# Patient Record
Sex: MALE | Race: WHITE | Employment: OTHER | URBAN - METROPOLITAN AREA
[De-identification: names, ages, dates, MRNs, and addresses within clinical notes are randomized per-mention and may not be internally consistent; named-entity substitution may affect disease eponyms.]

---

## 2022-12-17 ENCOUNTER — HOSPITAL ENCOUNTER (EMERGENCY)
Age: 77
Discharge: HOME | End: 2022-12-17
Attending: EMERGENCY MEDICINE
Payer: MEDICARE

## 2022-12-17 VITALS
DIASTOLIC BLOOD PRESSURE: 53 MMHG | RESPIRATION RATE: 16 BRPM | OXYGEN SATURATION: 98 % | TEMPERATURE: 98.1 F | HEIGHT: 72 IN | WEIGHT: 188 LBS | BODY MASS INDEX: 25.47 KG/M2 | SYSTOLIC BLOOD PRESSURE: 136 MMHG | HEART RATE: 58 BPM

## 2022-12-17 DIAGNOSIS — H61.23 BILATERAL IMPACTED CERUMEN: Primary | ICD-10-CM

## 2022-12-17 PROCEDURE — 99283 EMERGENCY DEPT VISIT LOW MDM: CPT

## 2022-12-17 PROCEDURE — 74011250637 HC RX REV CODE- 250/637: Performed by: EMERGENCY MEDICINE

## 2022-12-17 RX ORDER — DOCUSATE SODIUM 100 MG/1
100 CAPSULE, LIQUID FILLED ORAL
Status: COMPLETED | OUTPATIENT
Start: 2022-12-17 | End: 2022-12-17

## 2022-12-17 RX ADMIN — DOCUSATE SODIUM 100 MG: 100 CAPSULE, LIQUID FILLED ORAL at 14:04

## 2022-12-17 NOTE — ED TRIAGE NOTES
Pt presents to ED with c/o fullness in right ear since yesterday. It states some intermittent pain. He denies other symptoms.

## 2022-12-17 NOTE — ED PROVIDER NOTES
60-year-old male with muffled hearing of the right little bit of the left. Has a history of cerumen impactions with this type of symptom. No fever or chill. No ear drainage and no pain. The history is provided by the patient. Ear Pain   This is a recurrent problem. The current episode started 2 days ago. The problem occurs constantly. The problem has not changed since onset. Patient complains that both ears are affected. There has been no fever. The patient is experiencing no pain. Pertinent negatives include no ear discharge, no headaches, no hearing loss, no rhinorrhea, no sore throat, no abdominal pain, no vomiting and no rash. His past medical history does not include chronic ear infection or hearing loss. Past Medical History:   Diagnosis Date    Gastrointestinal disorder        Past Surgical History:   Procedure Laterality Date    HX ORTHOPAEDIC      GA ABDOMEN SURGERY PROC UNLISTED           History reviewed. No pertinent family history. Social History     Socioeconomic History    Marital status:      Spouse name: Not on file    Number of children: Not on file    Years of education: Not on file    Highest education level: Not on file   Occupational History    Not on file   Tobacco Use    Smoking status: Never    Smokeless tobacco: Current   Substance and Sexual Activity    Alcohol use: Yes     Comment: rarely    Drug use: Not on file    Sexual activity: Not on file   Other Topics Concern    Not on file   Social History Narrative    Not on file     Social Determinants of Health     Financial Resource Strain: Not on file   Food Insecurity: Not on file   Transportation Needs: Not on file   Physical Activity: Not on file   Stress: Not on file   Social Connections: Not on file   Intimate Partner Violence: Not on file   Housing Stability: Not on file         ALLERGIES: Patient has no known allergies. Review of Systems   Constitutional:  Negative for chills and fever.    HENT:  Positive for ear pain. Negative for congestion, ear discharge, hearing loss, rhinorrhea, sneezing and sore throat. Eyes:  Negative for redness and visual disturbance. Respiratory:  Negative for shortness of breath. Cardiovascular:  Negative for chest pain and leg swelling. Gastrointestinal:  Negative for abdominal pain, nausea and vomiting. Genitourinary:  Negative for difficulty urinating and frequency. Musculoskeletal:  Negative for back pain, myalgias and neck stiffness. Skin:  Negative for rash. Neurological:  Negative for dizziness, syncope, weakness and headaches. Hematological:  Negative for adenopathy. All other systems reviewed and are negative. Vitals:    12/17/22 1307 12/17/22 1353 12/17/22 1354 12/17/22 1355   BP:  (!) 136/53     Pulse:  (!) 58     Resp:  16     Temp:  98.1 °F (36.7 °C)     SpO2: 98% 98%  98%   Weight:   85.3 kg (188 lb)    Height:   6' (1.829 m)             Physical Exam  Vitals and nursing note reviewed. Constitutional:       Appearance: Normal appearance. He is well-developed. HENT:      Head: Normocephalic and atraumatic. Right Ear: There is impacted cerumen. Left Ear: There is impacted cerumen. Cardiovascular:      Rate and Rhythm: Normal rate and regular rhythm. Pulses: Normal pulses. Heart sounds: Normal heart sounds. Pulmonary:      Effort: Pulmonary effort is normal. No respiratory distress. Breath sounds: Normal breath sounds. Chest:      Chest wall: No tenderness. Abdominal:      General: Bowel sounds are normal.      Palpations: Abdomen is soft. Tenderness: There is no abdominal tenderness. There is no guarding or rebound. Musculoskeletal:      Cervical back: Full passive range of motion without pain, normal range of motion and neck supple. Skin:     General: Skin is warm and dry. Findings: No erythema or rash. Neurological:      Mental Status: He is alert and oriented to person, place, and time.    Psychiatric: Speech: Speech normal.         Behavior: Behavior normal.         Thought Content:  Thought content normal.         Judgment: Judgment normal.        MDM         Procedures

## 2022-12-17 NOTE — ED NOTES
Seen by Dr. Toña Reddy and discharge instructions given. The patient was discharged home by provider in stable condition. The patient is alert and oriented, in no respiratory distress and discharge vital signs obtained. The patient's diagnosis, condition and treatment were explained. The patient expressed understanding. E prescriptions given. No work/school note given. A discharge plan has been developed. A  was not involved in the process. Aftercare instructions were given. Pt ambulatory out of the ED with family.

## 2023-01-17 ENCOUNTER — HOSPITAL ENCOUNTER (EMERGENCY)
Age: 78
Discharge: HOME | End: 2023-01-17
Attending: EMERGENCY MEDICINE
Payer: MEDICARE

## 2023-01-17 VITALS
RESPIRATION RATE: 16 BRPM | TEMPERATURE: 97.4 F | HEIGHT: 75 IN | OXYGEN SATURATION: 99 % | BODY MASS INDEX: 29.22 KG/M2 | DIASTOLIC BLOOD PRESSURE: 53 MMHG | WEIGHT: 235 LBS | SYSTOLIC BLOOD PRESSURE: 93 MMHG | HEART RATE: 62 BPM

## 2023-01-17 DIAGNOSIS — H61.23 BILATERAL IMPACTED CERUMEN: Primary | ICD-10-CM

## 2023-01-17 PROCEDURE — 75810000150 HC RMVL IMPACTED CERUMEN 1 / 2

## 2023-01-17 PROCEDURE — 99282 EMERGENCY DEPT VISIT SF MDM: CPT

## 2023-01-17 NOTE — ED PROVIDER NOTES
Ear Pain   Pertinent negatives include no rhinorrhea. Patient is a 69 y.o. M who presents today with complaints of cerumen impaction. He was seen here in the ED on 12/17 for bilateral cerumen impaction, prescribed Debrox. He states he has been using this and has an improvement of symptoms. Back today for reevaluation, has not followed up with primary or ENT. Reports decreased hearing in the right ear for the last several months, denies dizziness, headache, fever, sore throat. PMH: None  Surgical History: None  Smoking: None  Alcohol: None  Drug Use: None  ALLERGIES: Patient has no known allergies. Past Medical History:   Diagnosis Date    Gastrointestinal disorder      Past Surgical History:   Procedure Laterality Date    HX ORTHOPAEDIC      MD ABDOMEN SURGERY PROC UNLISTED       No family history on file. Social History     Socioeconomic History    Marital status:      Spouse name: Not on file    Number of children: Not on file    Years of education: Not on file    Highest education level: Not on file   Occupational History    Not on file   Tobacco Use    Smoking status: Never    Smokeless tobacco: Current   Substance and Sexual Activity    Alcohol use: Yes     Comment: rarely    Drug use: Not on file    Sexual activity: Not on file   Other Topics Concern    Not on file   Social History Narrative    Not on file     Social Determinants of Health     Financial Resource Strain: Not on file   Food Insecurity: Not on file   Transportation Needs: Not on file   Physical Activity: Not on file   Stress: Not on file   Social Connections: Not on file   Intimate Partner Violence: Not on file   Housing Stability: Not on file           Review of Systems   Constitutional:  Negative for fever. HENT:  Positive for ear pain. Negative for nosebleeds and rhinorrhea. Eyes:  Negative for pain.      Vitals:    01/17/23 1130   BP: (!) 93/53   Pulse: 62   Resp: 16   Temp: 97.4 °F (36.3 °C)   SpO2: 99%   Weight: 106.6 kg (235 lb)   Height: 6' 3\" (1.905 m)            Physical Exam  Constitutional:       General: He is not in acute distress. Appearance: Normal appearance. He is normal weight. He is not ill-appearing, toxic-appearing or diaphoretic. HENT:      Head: Normocephalic and atraumatic. Right Ear: No drainage or tenderness. There is impacted cerumen. Left Ear: No tenderness. There is impacted cerumen. Nose: Nose normal.   Eyes:      Extraocular Movements: Extraocular movements intact. Neurological:      Mental Status: He is alert. LABORATORY RESULTS:  No results found for this or any previous visit (from the past 24 hour(s)). IMAGING RESULTS:  No results found. MEDICATIONS GIVEN:  Medications - No data to display         MDM  EAR CERUMEN REMOVAL NOTEWRITER (ASAP ONLY)    Date/Time: 1/17/2023 12:37 PM  Performed by: Nirmala Morgan PA-C  Authorized by: Nirmala Morgan PA-C     Consent:     Consent obtained:  Verbal    Consent given by:  Patient    Risks, benefits, and alternatives were discussed: yes      Risks discussed:  Bleeding, incomplete removal, TM perforation, pain and dizziness    Alternatives discussed:  No treatment  Universal protocol:     Patient identity confirmed:  Verbally with patient  Procedure details:     Location:  L ear and R ear    Procedure type: curette      Successful cerumen removal: 90% of cerumen removed. Post-procedure details: Inspection:  Bleeding, some cerumen remaining and TM intact    Hearing quality:  Improved    Procedure completion:  Tolerated well, no immediate complications         Note: Patient hypotensive overnight of a degree. States this is his baseline and he is always hypotensive. Recommend follow-up with primary care provider for medication management. Discussed results and work-up with patient and answered all questions, the patient expresses understanding and agrees with the care plan and disposition.   The patient was given an opportunity to ask questions and all concerns raised were addressed prior to discharge. Recommended patient follow-up with provider as listed below. Counseled patient on standard home and self-care measures. Specifically explained the emergent conditions that could arise and clearly instructed the patient to return to the emergency department for those and any other new, worsening, or concerning symptoms. Patient stable and ready for discharge. History, exam, medical decision making, and plan discussed with ED attending, Dr. Vernon Pratt. IMPRESSION:  1. Bilateral impacted cerumen        DISPOSITION:  Discharge    PLAN:  Follow-up Information       Follow up With Specialties Details Why [address] [place] [address] ENT  Schedule an appointment as soon as possible for a visit   [address] [facility] [address] [facility]          Current Discharge Medication List            Please note that this dictation was completed with [employer], the computer voice recognition software. Quite often unanticipated grammatical, syntax, homophones, and other interpretive errors are inadvertently transcribed by the computer software. Please disregard these errors. Please excuse any errors that have escaped final proofreading.

## 2023-01-17 NOTE — ED TRIAGE NOTES
Pt presents to ED for right ear pain starting \"early December\". Pt states he came today because he \" feels he is loosing his hearing ans echoing. \" Pt Vss and in no acute distress. Pt denies any injury to ear.